# Patient Record
Sex: MALE | Race: WHITE | NOT HISPANIC OR LATINO | Employment: FULL TIME | URBAN - METROPOLITAN AREA
[De-identification: names, ages, dates, MRNs, and addresses within clinical notes are randomized per-mention and may not be internally consistent; named-entity substitution may affect disease eponyms.]

---

## 2017-01-31 ENCOUNTER — CLINICAL SUPPORT (OUTPATIENT)
Dept: GENETICS | Facility: HOSPITAL | Age: 37
End: 2017-01-31

## 2017-01-31 DIAGNOSIS — Z84.89 FAMILY HISTORY OF GENETIC DISEASE: Primary | ICD-10-CM

## 2017-01-31 PROCEDURE — 96040: CPT | Performed by: GENETIC COUNSELOR, MS

## 2017-02-02 NOTE — PROGRESS NOTES
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  Dr. Angelo’s mother has had a history of migraines and recently had a seizure, which led to a brain MRI.  Brain MRI demonstrated widespread white matter disease, which eventually led to genetic testing and confirmation of CADASIL.  DrWilma Angelo does not have a personal history of associated symptoms, and has never had a brain MRI.  Dr. Stephens was interested in discussing the pros and cons of genetic testing for the identified familial mutation and the information that would be gained by genetic testing.    REPORTED FAMILY HISTORY:  Mother:  CADASIL, positive for NOTCH3 mutation     Hx of migraines since teens     Seizures     Word finding difficulty for last 10-15 years  Mat. Grandmother: Hx of ministrokes     Nonverbal and bed ridden for last several years of life       We do have records regarding DrWilma Angelo’s mother’s genetic testing.    GENETIC COUNSELING (40 minutes): CADASIL (cerebral autosomal dominant arteriopathy with subcortical infarcts and leukoencephalopathy) is characterized by mid-adult onset of recurrent ischemic stroke (average age is 47), cognitive decline progressing to dementia (75% experience), a history of migraine with aura (30-40% experience), mood disturbance, apathy, and diffuse white matter lesions and subcortical infarcts on neuroimaging.  The onset of symptoms and severity of symptoms can be quite variable, even within families.  The majority of individuals with CADASIL have identifiable mutations in the NOTCH3 gene, which is the only gene identified at this time to be associated with the condition.  Dr. Stephens’s mother has an identified NOTCH3 mutation.  CADASIL is inherited in an autosomal dominant manner.   Each child of an affected individual is at a 50% risk of inheriting the pathogenic variant and  developing signs of the disease.   This condition is believed to have complete penetrance, meaning everyone who inherits the mutation is expected to show symptoms of the disease at some point.  If DrWilma Stephens tested negative for the specific mutation identified in his mother, this would be considered a “true negative” result, and he would not be at risk for the condition or at risk to pass it on to his children.      GENETIC TESTING:  The risks, benefits and limitations of gene testing and implications for clinical management following testing were reviewed.  DNA test results can influence decisions regarding screening, prevention and surgical management.  Genetic testing can have significant psychological implications for both individuals and families.  Also discussed was the possibility of employment and insurance discrimination based on genetic test results and the laws ([place]) in place to prevent this, as well as the limitations of these laws.    The implications of a positive or negative test result were discussed.  We also discussed that, in some cases, genetic test results may be uninformative or may be ambiguous due to the identification of a genetic variant.  Since a mutation has been identified in an affected individual in this family, single site testing can be offered.  In these cases, there is only the possibility of a positive or a negative result.  We discussed that there are not preventative treatment currently available for CADASIL, so the identification of a NOTCH3 mutation in an unaffected individual does not change immediate management.      PLAN:  We discussed the impact that learning this information would have on his life plans and priorities.  Dr. Stephens is going to meet with Dr. Xiomara Mac, the clinical psychologist at [facility] to discuss this further prior to pursuing testing.  He is scheduled to see her on 2/28/17.  Dr. Stephens is going to contact me if he does decide to pursue  testing.  Genetic counseling remains available to Dr. Stephens and he is welcome to contact us with any questions or concerns.        Josefa Varghese MS, [facility]  Certified Genetic Counselor

## 2017-02-28 ENCOUNTER — OFFICE VISIT (OUTPATIENT)
Dept: PSYCHIATRY | Facility: CLINIC | Age: 37
End: 2017-02-28

## 2017-02-28 DIAGNOSIS — F43.21 ADJUSTMENT DISORDER WITH DEPRESSED MOOD: Primary | ICD-10-CM

## 2017-02-28 PROCEDURE — 90791 PSYCH DIAGNOSTIC EVALUATION: CPT | Performed by: PSYCHOLOGIST

## 2017-04-05 NOTE — PROGRESS NOTES
Psych Progress Note    Jonathan Stephens is 36 y.o. male     1. Description of Session    The pt's chief complaint was stress. A psychological evaluation was conducted in order to assess past and current level of functioning. Areas assessed included, but were not limited to: perception of social support, perception of ability to face and deal with challenges in life (positive functioning), anxiety symptoms, depressive symptoms, perspective on beliefs/belief system, coping skills for stress, intelligence level, etc. Therapeutic rapport was established. Interventions conducted today were geared towards stress management, mood management, and improving communication with others.. Education was also provided as to the nature of counseling with this therapist and what to expect in subsequent sessions. A treatment plan was initiated tailored to meeting pt’s presenting needs. The pt was encouraged to return for additional sessions and agreed to do so.     2. Assessment/Diagnostic Impression    The pt presents as suffering from stress, in part due to difficulties communicating with people but also due to impaired coping skills for stress and engaging in maladaptive thought patterns. Depressive symptoms were evident and seem to be manifested as anger issues, particularly with those closest to him.    3. Treatment Plan    Provide mood management techniques, CBT, and anger management.

## 2020-07-02 ENCOUNTER — TELEMEDICINE (OUTPATIENT)
Dept: CARDIOLOGY | Facility: CLINIC | Age: 40
End: 2020-07-02

## 2020-07-02 VITALS
BODY MASS INDEX: 23.98 KG/M2 | SYSTOLIC BLOOD PRESSURE: 129 MMHG | DIASTOLIC BLOOD PRESSURE: 72 MMHG | HEART RATE: 66 BPM | WEIGHT: 177 LBS | HEIGHT: 72 IN

## 2020-07-02 DIAGNOSIS — Z82.49 FAMILY HISTORY OF PREMATURE CAD: ICD-10-CM

## 2020-07-02 DIAGNOSIS — I10 HYPERTENSION, ESSENTIAL: Primary | ICD-10-CM

## 2020-07-02 PROCEDURE — 99203 OFFICE O/P NEW LOW 30 MIN: CPT | Performed by: INTERNAL MEDICINE

## 2020-07-02 RX ORDER — VALACYCLOVIR HYDROCHLORIDE 1 G/1
TABLET, FILM COATED ORAL AS NEEDED
COMMUNITY

## 2020-07-02 RX ORDER — LISINOPRIL 20 MG/1
TABLET ORAL DAILY
COMMUNITY
End: 2020-07-02

## 2020-07-02 RX ORDER — LISINOPRIL 20 MG/1
20 TABLET ORAL DAILY
Qty: 90 TABLET | Refills: 3 | Status: SHIPPED | OUTPATIENT
Start: 2020-07-02 | End: 2020-07-06 | Stop reason: SDUPTHER

## 2020-07-02 RX ORDER — IBUPROFEN 800 MG/1
TABLET ORAL AS NEEDED
COMMUNITY

## 2020-07-02 NOTE — PROGRESS NOTES
OFFICE VISIT  NOTE  [facility] CARDIOLOGY      Name: Jonathan Stephens    Date: 2020  MRN:  [id number]  :  1980      REFERRING/PRIMARY PROVIDER:  Adelaida Crespo MD    Chief Complaint   Patient presents with   • Hypertension       HPI: Jonathan Stephens is a 39 y.o. male who presents today for new consultation for essential hypertension and family history of premature CAD.  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  Over the years with lifestyle modification he was able to wean down to lisinopril 20 mg daily.  Blood pressure well controlled currently, he takes his blood pressure 2 times per month generally, usually in the 130 or lower systolic range, diastolics 70s to 80s.  He states blood pressures never greater than 140 systolic.  He had a renal arterial duplex in 2016 which was normal.  He has a family history of hypertension both his father and mother, and his older brother were diagnosed with hypertension in their mid 20s.  He denies vision change, headaches, chest pain, shortness of breath, or edema.  Last blood work 1 year ago was normal.  He also has a family history of premature CAD, both his father and his paternal uncle had coronary disease in their mid 50s, he is concerned about his specific risk.  Of note, his mother was diagnosed with CADASIL (cerebral autosomal dominant arteriopathy with subcortical infarcts and leukoencephalopathy).  He discussed this with a genetic counselor, but elected not to pursue genetic testing.  He has no history of migraines, or other neurologic symptoms.  The patient gets at least 150 minutes of aerobic exercise weekly, get 7 to 8 hours of sleep per night, and drinks 3 caffeinated drinks daily.    Past medical history:  Essential hypertension    Past Surgical History:   Procedure Laterality Date   • VASECTOMY         Social  "History     Socioeconomic History   • Marital status:      Spouse name: Not on file   • Number of children: Not on file   • Years of education: Not on file   • Highest education level: Not on file   Tobacco Use   • Smoking status: Former Smoker   • Smokeless tobacco: Never Used   Substance and Sexual Activity   • Alcohol use: Yes   • Drug use: Never   • Sexual activity: Defer       Family History   Problem Relation Age of Onset   • Other Mother         CADASIL   • Coronary artery disease Father 50   • Hypertension Brother 20        ROS:   Constitutional no fever,  no weight loss   Skin no rash, no subcutaneous nodules   Otolaryngeal no difficulty swallowing   Cardiovascular See HPI   Pulmonary no cough, no sputum production   Gastrointestinal no constipation, no diarrhea   Genitourinary no dysuria, no hematuria   Hematologic no easy bruisability, no abnormal bleeding   Musculoskeletal no muscle pain   Neurologic no dizziness, no falls         No Known Allergies      Current Outpatient Medications:   •  ibuprofen (ADVIL,MOTRIN) 800 MG tablet, Every 8 (Eight) Hours., Disp: , Rfl:   •  lisinopril (PRINIVIL,ZESTRIL) 20 MG tablet, 1 tablet Daily., Disp: 90 tablet, Rfl: 3  •  valACYclovir (Valtrex) 1000 MG tablet, As Needed., Disp: , Rfl:     Vitals:    07/02/20 0908   BP: 129/72   BP Location: Left arm   Patient Position: Sitting   Pulse: 66   Weight: 80.3 kg (177 lb)   Height: 182.9 cm (72\")     Body mass index is 24.01 kg/m².    PHYSICAL EXAM:    VITALS: Pt reports temp of 98.4 degrees F orally. Home BP today 129/72, and Heart rate of 66  Weight measured at home: 177 Lbs.  GENERAL: Stable appearing, in no distress. Alert and oriented.  SKIN: no visible facial rash or concerning facial lesions noted. No skin redness or discoloration seen. Patient denies new skin findings on brief self-exam of arms, legs, chest, abdomen.  EYES: conjunctiva clear, sclera non-icteric, no eye drainage, grossly normal EOM.  EARS: " hearing grossly intact, no pain elicited with ear tugging bilaterally  MOUTH: no visible perioral lesions, no perioral cyanosis, no lip swelling.  NECK: Grossly normal ROM, no visible thyroid enlargement. Patient did not palpate any cervical LAD under physician-guided exam.  HEART: Patient self-reported heart rate of 66 beats per minute (measured by pt with physician instruction, or by fitness tracker HR monitor).  LUNGS: Does not appear dyspneic. No audible wheezes or rales. No nasal flaring.  ABDOMEN: no tenderness with patient self-palpation diffusely around abdomen under physician guidance, no pain elicited by leg lifts  MUSCULOSKELETAL: No significant cervical kyphosis, grossly normal active ROM in upper extremities.  NEURO: Intact recent memory. No facial or eyelid drooping. No speech impairment, answers questions appropriately.  PSYCH: Judgment and insight good; normal mood and affect.        RESULTS:   Procedures           Labs:  No results found for: CHOL, TRIG, HDL, LDL, AST, ALT  No results found for: HGBA1C  No components found for: CREATINININE  No results found for: EGFRIFNONA      ASSESSMENT:  Problem List Items Addressed This Visit        Cardiovascular and Mediastinum    Hypertension, essential - Primary    Relevant Medications    lisinopril (PRINIVIL,ZESTRIL) 20 MG tablet       Other    Family history of premature CAD          PLAN:    1.  Essential hypertension:  Negative secondary work-up in 2016  Long-term goal blood pressure less than 130/80, ideally closer to 120/80  At this time he has good blood pressure control on lisinopril 20 mg daily  Continue lisinopril 20 mg daily, refill sent to [facility].  Check BMP yearly  We discussed the importance of continued healthy lifestyle habits such as exercise, sleep, and diet    2.  Family history of premature CAD:  While the patient is currently asymptomatic, but he has risk factors for coronary disease  I recommended a coronary CTA with calcium  score to further rule stratify  If coronary plaque is noted, statin therapy will be started.  He will think about the test and call us to schedule.    I also recommend a fasting lipid panel at his next PCP visit    3.  Family history of CADASIL:  Patient's mother has the condition, he discussed this with a genetic counselor, but elected not to be tested.  We will continue aggressive risk factor modification with blood pressure control and healthy lifestyle.    Return to clinic in 12 months for a video visit, or sooner as needed.    This patient has consented to a telehealth visit via video visit. The visit was scheduled as a video conference visit to comply with patient safety concerns in accordance with CDC recommendations.  All vitals recorded within this visit are reported by the patient.  I spent 30 minutes in total including but not limited to the 22 minutes spent in direct conversation with this patient.       Pb Billingsley MD, [facility]  Office: [phone] [address] [address]

## 2020-07-06 RX ORDER — LISINOPRIL 20 MG/1
20 TABLET ORAL DAILY
Qty: 90 TABLET | Refills: 3 | Status: SHIPPED | OUTPATIENT
Start: 2020-07-06

## 2021-06-15 ENCOUNTER — TELEPHONE (OUTPATIENT)
Dept: ORTHOPEDIC SURGERY | Facility: CLINIC | Age: 41
End: 2021-06-15

## 2021-06-15 NOTE — TELEPHONE ENCOUNTER
I called the patient per Dr. Borden to have him added onto the schedule per Dr. Borden in about 2 weeks. When he calls back, have him transferred to the clinic.    Veronica MISHRA

## 2021-06-29 ENCOUNTER — OFFICE VISIT (OUTPATIENT)
Dept: ORTHOPEDIC SURGERY | Facility: CLINIC | Age: 41
End: 2021-06-29

## 2021-06-29 VITALS
HEART RATE: 73 BPM | SYSTOLIC BLOOD PRESSURE: 140 MMHG | BODY MASS INDEX: 23.5 KG/M2 | DIASTOLIC BLOOD PRESSURE: 74 MMHG | HEIGHT: 72 IN | WEIGHT: 173.5 LBS

## 2021-06-29 DIAGNOSIS — M25.511 RIGHT SHOULDER PAIN, UNSPECIFIED CHRONICITY: ICD-10-CM

## 2021-06-29 DIAGNOSIS — G25.89 SCAPULAR DYSKINESIS: ICD-10-CM

## 2021-06-29 DIAGNOSIS — M19.011 OSTEOARTHRITIS OF RIGHT AC (ACROMIOCLAVICULAR) JOINT: Primary | ICD-10-CM

## 2021-06-29 PROCEDURE — 99203 OFFICE O/P NEW LOW 30 MIN: CPT | Performed by: ORTHOPAEDIC SURGERY

## 2021-06-29 PROCEDURE — 20605 DRAIN/INJ JOINT/BURSA W/O US: CPT | Performed by: ORTHOPAEDIC SURGERY

## 2021-06-29 RX ORDER — LIDOCAINE HYDROCHLORIDE 10 MG/ML
3 INJECTION, SOLUTION INFILTRATION; PERINEURAL
Status: COMPLETED | OUTPATIENT
Start: 2021-06-29 | End: 2021-06-29

## 2021-06-29 RX ORDER — METHYLPREDNISOLONE ACETATE 80 MG/ML
80 INJECTION, SUSPENSION INTRA-ARTICULAR; INTRALESIONAL; INTRAMUSCULAR; SOFT TISSUE
Status: COMPLETED | OUTPATIENT
Start: 2021-06-29 | End: 2021-06-29

## 2021-06-29 RX ORDER — PIMECROLIMUS 10 MG/G
CREAM TOPICAL
COMMUNITY
Start: 2021-05-03

## 2021-06-29 RX ADMIN — LIDOCAINE HYDROCHLORIDE 3 ML: 10 INJECTION, SOLUTION INFILTRATION; PERINEURAL at 13:58

## 2021-06-29 RX ADMIN — METHYLPREDNISOLONE ACETATE 80 MG: 80 INJECTION, SUSPENSION INTRA-ARTICULAR; INTRALESIONAL; INTRAMUSCULAR; SOFT TISSUE at 13:58

## 2021-06-29 NOTE — PROGRESS NOTES
[facility] Orthopaedic Surgery Office Visit - Isaac Borden MD    Office Visit       Patient Name: Jonathan Stephens    Chief Complaint:   Chief Complaint   Patient presents with   • Right Shoulder - Pain       Referring Physician: No ref. provider found    History of Present Illness:   Jonathan Stephens is a 40 y.o. male who presents with right body part: shoulder Reason: pain.  Onset:Onset: atraumatic and gradual in nature. The issue has been ongoing for 4 year(s). Pain is a 3/10 on the pain scale. Pain is described as Pain Characterization: dull and aching. Associated symptoms include Symptoms: pain and popping. The pain is worse with sleeping, leisure and lying on affected side; resting improve the pain. Previous treatments have included: NSAIDS.  I have reviewed the patient's history of present illness as noted/entered above.    I have reviewed the patient's past medical history, surgical history, social history, family history, medications, and allergies as noted in the electronic medical record and as noted/entered.  I have reviewed the patient's review of systems as noted/enter and updated as noted in the patient's HPI.    Right AC joint osteolysis  Longtime friend that I treated in the past for right AC joint osteolysis compared to prior outside hospital/[facility] images -12/23/2015 [facility]    Right AC joint pain persist affecting sleep and ADLs    Ophthalmologist      Subjective   Subjective      Review of Systems   Constitutional: Negative.  Negative for chills, fatigue and fever.   HENT: Negative.  Negative for congestion and dental problem.    Eyes: Negative.  Negative for blurred vision.   Respiratory: Negative.  Negative for shortness of breath.    Cardiovascular: Negative.  Negative for leg swelling.   Gastrointestinal: Negative.  Negative for abdominal pain.   Endocrine: Negative.  Negative for polyuria.   Genitourinary:  Negative.  Negative for difficulty urinating.   Musculoskeletal: Positive for arthralgias.   Skin: Negative.    Allergic/Immunologic: Negative.    Neurological: Negative.    Hematological: Negative.  Negative for adenopathy.   Psychiatric/Behavioral: Negative.  Negative for behavioral problems.        Past Medical History:   Past Medical History:   Diagnosis Date   • HTN, goal below 130/80    • Osteoarthritis        Past Surgical History:   Past Surgical History:   Procedure Laterality Date   • KNEE SURGERY      osteochondroma   • VASECTOMY         Family History:   Family History   Problem Relation Age of Onset   • Other Mother         CADASIL   • Cancer Mother    • Stroke Mother    • Hypertension Mother    • Coronary artery disease Father 50   • Osteoarthritis Father    • Hypertension Father    • Heart attack Father    • Hypertension Brother 20       Social History:   Social History     Socioeconomic History   • Marital status:      Spouse name: Not on file   • Number of children: Not on file   • Years of education: Not on file   • Highest education level: Not on file   Tobacco Use   • Smoking status: Former Smoker   • Smokeless tobacco: Never Used   Substance and Sexual Activity   • Alcohol use: Yes   • Drug use: Never   • Sexual activity: Defer       Medications:   Current Outpatient Medications:   •  lisinopril (PRINIVIL,ZESTRIL) 20 MG tablet, Take 1 tablet by mouth Daily., Disp: 90 tablet, Rfl: 3  •  ibuprofen (ADVIL,MOTRIN) 800 MG tablet, Every 8 (Eight) Hours., Disp: , Rfl:   •  pimecrolimus (ELIDEL) 1 % cream, , Disp: , Rfl:   •  valACYclovir (Valtrex) 1000 MG tablet, As Needed., Disp: , Rfl:     Allergies: No Known Allergies    The following portions of the patient's history were reviewed and updated as appropriate: allergies, current medications, past family history, past medical history, past social history, past surgical history and problem list.        Objective    Objective      Vital Signs:    "  Vitals:    06/29/21 1318   BP: 140/74   Pulse: 73   Weight: 78.7 kg (173 lb 8 oz)   Height: 182.9 cm (72.01\")       Ortho Exam:  RIGHT AC joint pain with crossbody adduction  Stable AC joint  5/5 RC strength  Well perfused  SLT intact  No skin changes    Results Review:   Imaging Results (Last 24 Hours)     Procedure Component Value Units Date/Time    XR Shoulder 1 View Bilateral [[id number]] Resulted: 06/29/21 1355     Updated: 06/29/21 1356    Narrative:      Imaging: AC joint x-rays - bilateral for comparison    Bilateral AC joint x-rays    Affected/Injured Side: RIGHT    Indication for AC joint x-ray: AC joint pain    Comparison: the unaffected/non-injured side had an x-ray of the AC joint   performed today for comparison to the affected/injured side    Findings: Right AC joint shows distal clavicle osteolysis and joint space   narrowing consistent with an early arthritic process.    The unaffected/comparison side shows a well aligned AC joint.    I personally reviewed the above x-rays and discussed with the patient.              Procedures     RIGHT SHOULDER ACROMIOCLAVICULAR JOINT INJECTION:  Risks and benefits of a shoulder acromioclavicular joint injection were discussed and the patient desired to proceed. Verbal consent was obtained. The patient understood the risk of infection, potential skin changes, bump in blood glucose especially with diabetes, nerve injury, possibility of increased pain in the short term, and possible incomplete pain relief. Using sterile technique, the shoulder acromioclavicular joint was injected from a superior approach with 1mL of 80mg/mL Depo Medrol and 4cc of lidocaine with aspiration prior to injection. The patient tolerated the procedure without difficulty.  CPT CODE 74818 for intermediate joint (AC joint) aspiration/injection          Assessment / Plan      Assessment/Plan:   Problem List Items Addressed This Visit        Musculoskeletal and Injuries    Osteoarthritis of " right AC (acromioclavicular) joint - Primary    Relevant Orders    Ambulatory Referral to Physical Therapy Evaluate and treat, Ortho (Completed)       Neuro    Scapular dyskinesis    Relevant Orders    Ambulatory Referral to Physical Therapy Evaluate and treat, Ortho (Completed)      Other Visit Diagnoses     Right shoulder pain, unspecified chronicity        Relevant Orders    XR Shoulder 1 View Bilateral (Completed)          RIGHT AC JOINT pain  Corticosteroid injection completed, recommend conservative course, physical therapy with James Morris at [facility]  I think he will continue to well a conservative course, scapular stabilization exercises will help    Follow Up: He is a close friend and will call me with any issues along the way and for follow-up.    Great to see Dr. Angelo Borden MD, FAAOS  Orthopedic Surgeon  Fellowship Trained Shoulder and Elbow Surgeon  [facility]  Orthopedics and Sports Medicine  [address] [place] [address], [address]  [place] [address]    06/29/21  14:14 EDT    Please note that portions of this note may have been completed with a voice recognition program. Efforts were made to edit the dictations, but occasionally words are mistranscribed.

## 2021-06-29 NOTE — PROGRESS NOTES
Procedure   Medium Joint Arthrocentesis: R acromioclavicular  Date/Time: 6/29/2021 1:58 PM  Consent given by: patient  Site marked: site marked  Timeout: Immediately prior to procedure a time out was called to verify the correct patient, procedure, equipment, support staff and site/side marked as required   Supporting Documentation  Indications: pain   Procedure Details  Location: shoulder - R acromioclavicular  Preparation: Patient was prepped and draped in the usual sterile fashion  Needle size: 23 G  Approach: superior  Medications administered: 80 mg methylPREDNISolone acetate 80 MG/ML; 3 mL lidocaine 1 %  Patient tolerance: patient tolerated the procedure well with no immediate complications

## 2022-01-11 ENCOUNTER — OFFICE VISIT (OUTPATIENT)
Dept: ORTHOPEDIC SURGERY | Facility: CLINIC | Age: 42
End: 2022-01-11

## 2022-01-11 VITALS
BODY MASS INDEX: 24.19 KG/M2 | WEIGHT: 178.6 LBS | SYSTOLIC BLOOD PRESSURE: 112 MMHG | HEIGHT: 72 IN | DIASTOLIC BLOOD PRESSURE: 70 MMHG

## 2022-01-11 DIAGNOSIS — M19.011 OSTEOARTHRITIS OF RIGHT AC (ACROMIOCLAVICULAR) JOINT: Primary | ICD-10-CM

## 2022-01-11 PROCEDURE — 20605 DRAIN/INJ JOINT/BURSA W/O US: CPT | Performed by: ORTHOPAEDIC SURGERY

## 2022-01-11 PROCEDURE — 99213 OFFICE O/P EST LOW 20 MIN: CPT | Performed by: ORTHOPAEDIC SURGERY

## 2022-01-11 RX ORDER — KETOCONAZOLE 20 MG/ML
SHAMPOO TOPICAL AS NEEDED
COMMUNITY

## 2022-01-11 RX ORDER — TRIAMCINOLONE ACETONIDE 40 MG/ML
40 INJECTION, SUSPENSION INTRA-ARTICULAR; INTRAMUSCULAR
Status: COMPLETED | OUTPATIENT
Start: 2022-01-11 | End: 2022-01-11

## 2022-01-11 RX ORDER — LIDOCAINE HYDROCHLORIDE 10 MG/ML
3 INJECTION, SOLUTION EPIDURAL; INFILTRATION; INTRACAUDAL; PERINEURAL
Status: COMPLETED | OUTPATIENT
Start: 2022-01-11 | End: 2022-01-11

## 2022-01-11 RX ADMIN — TRIAMCINOLONE ACETONIDE 40 MG: 40 INJECTION, SUSPENSION INTRA-ARTICULAR; INTRAMUSCULAR at 13:20

## 2022-01-11 RX ADMIN — LIDOCAINE HYDROCHLORIDE 3 ML: 10 INJECTION, SOLUTION EPIDURAL; INFILTRATION; INTRACAUDAL; PERINEURAL at 13:20

## 2022-01-11 NOTE — PROGRESS NOTES
[facility] Orthopaedic Surgery Office Follow Up       Office Follow Up Visit       Patient Name: Jonathan Stephens    Chief Complaint:   Chief Complaint   Patient presents with   • Follow-up     6 month recheck - Osteoarthritis of Right AC joint       Referring Physician: No ref. provider found    History of Present Illness:   It has been 6  month(s) since Jonathan Stephens's last visit. Jonathan Stephens returns to clinic today for F/U: follow-up of rightBody Part: shoulderReason: arthritis. The issue has been ongoing for 2 year(s). Jonathan Stephens rates HIS/HER: hispain at 3/10 on the pain scale. Previous/current treatments: NSAIDS and physical therapy. Current symptoms:Symptoms: pain, popping and grinding. The pain is worse with working, leisure and lying on affected side; resting improves the pain. Overall, he/she: heis doing the same.  I have reviewed the patient's history of present illness as noted/entered above.    I have reviewed the patient's past medical history, surgical history, social history, family history, medications, and allergies as noted in the electronic medical record and as noted/entered.  I have reviewed the patient's review of systems as noted/enter and updated as noted in the patient's HPI.    Right AC joint osteolysis    1/11/2022:  AC joint pain has been exacerbated with more popping and pain with push-ups and distance running.  Significant pain over the weekend this past Sunday.  Prior injection did help significantly with complete relief      Prior:  Longtime friend that I treated in the past for right AC joint osteolysis compared to prior outside hospital/[facility] images -12/23/2015 [facility]     Right AC joint pain persist affecting sleep and ADLs     Ophthalmologist      Subjective   Subjective      Review of Systems   Constitutional: Negative.  Negative for chills, fatigue and fever.   HENT: Negative.  Negative for  congestion and dental problem.    Eyes: Negative.  Negative for blurred vision.   Respiratory: Negative.  Negative for shortness of breath.    Cardiovascular: Negative.  Negative for leg swelling.   Gastrointestinal: Negative.  Negative for abdominal pain.   Endocrine: Negative.  Negative for polyuria.   Genitourinary: Negative.  Negative for difficulty urinating.   Musculoskeletal: Positive for arthralgias.   Skin: Negative.    Allergic/Immunologic: Negative.    Neurological: Negative.    Hematological: Negative.  Negative for adenopathy.   Psychiatric/Behavioral: Negative.  Negative for behavioral problems.        Past Medical History:   Past Medical History:   Diagnosis Date   • HTN, goal below 130/80    • Osteoarthritis        Past Surgical History:   Past Surgical History:   Procedure Laterality Date   • KNEE SURGERY      osteochondroma   • VASECTOMY         Family History:   Family History   Problem Relation Age of Onset   • Other Mother         CADASIL   • Cancer Mother    • Stroke Mother    • Hypertension Mother    • Coronary artery disease Father 50   • Osteoarthritis Father    • Hypertension Father    • Heart attack Father    • Hypertension Brother 20       Social History:   Social History     Socioeconomic History   • Marital status:    Tobacco Use   • Smoking status: Former Smoker   • Smokeless tobacco: Never Used   Substance and Sexual Activity   • Alcohol use: Yes   • Drug use: Never   • Sexual activity: Defer       Medications:   Current Outpatient Medications:   •  ibuprofen (ADVIL,MOTRIN) 800 MG tablet, Every 8 (Eight) Hours., Disp: , Rfl:   •  ketoconazole (NIZORAL) 2 % shampoo, As Needed., Disp: , Rfl:   •  lisinopril (PRINIVIL,ZESTRIL) 20 MG tablet, Take 1 tablet by mouth Daily., Disp: 90 tablet, Rfl: 3  •  pimecrolimus (ELIDEL) 1 % cream, , Disp: , Rfl:   •  valACYclovir (Valtrex) 1000 MG tablet, As Needed., Disp: , Rfl:     Allergies: No Known Allergies    The following portions of the  "patient's history were reviewed and updated as appropriate: allergies, current medications, past family history, past medical history, past social history, past surgical history and problem list.        Objective    Objective      Vital Signs:   Vitals:    01/11/22 1249   BP: 112/70   Weight: 81 kg (178 lb 9.6 oz)   Height: 182.9 cm (72.01\")       Ortho Exam:  Pain with cross body adduction right shoulder  Some popping with AC joint testing.  Some mild baseline prominence of the distal clavicle pain about the distal clavicle  Negative DLS, negative rotator cuff testing active and passive range of motion intact pain with forward flexion abduction greater than 120    Results Review:  Imaging Results (Last 24 Hours)     Procedure Component Value Units Date/Time    XR Acromioclavicular Joint Right [[id number]] Resulted: 01/11/22 1314     Updated: 01/11/22 1316    Narrative:      Imaging: AC joint x-rays    Side: RIGHT AC JOINT    Indication for AC joint x-ray: AC joint pain    Comparison: Prior serial comparison views available    Findings: RIGHT AC joint baseline degenerative changes with some worsening   of the AC joint space narrowing and distal clavicle osteolysis.    I personally reviewed the above x-rays and discussed with the patient.            Procedures    RIGHT SHOULDER ACROMIOCLAVICULAR JOINT INJECTION:  Risks and benefits of a shoulder acromioclavicular joint injection were discussed and the patient desired to proceed. Verbal consent was obtained. The patient understood the risk of infection, potential skin changes, bump in blood glucose especially with diabetes, nerve injury, possibility of increased pain in the short term, and possible incomplete pain relief. Using sterile technique, the shoulder acromioclavicular joint was injected from a superior approach with 1mL of 40 mg triamcinolone acetonide 40 MG/ML and 4cc of lidocaine with aspiration prior to injection. The patient tolerated the procedure without " difficulty.  CPT CODE 70363 for intermediate joint (AC joint) aspiration/injection        Assessment / Plan      Assessment/Plan:   Problem List Items Addressed This Visit        Musculoskeletal and Injuries    Osteoarthritis of right AC (acromioclavicular) joint - Primary    Relevant Orders    XR Acromioclavicular Joint Right (Completed)          RIGHT SHOULDER --distal clavicle osteolysis, AC joint arthritic changes still remain fairly significant.  He is going to consider operative intervention likely would be a mini open distal clavicle resection.  We will get a CT scan in advance of this.  He has no glenohumeral joint or rotator cuff symptoms.  He is contemplating timing for this.  We will try another injection today.  We discussed continued nonoperative measures versus operative measures including recovery time operative intervention techniques.    Follow Up: He is a close friend keep me updated on possible timing of the CT scan and possible timing of surgery pending progress.      Isaac Borden MD, FAAOS  Orthopedic Surgeon  Fellowship Trained Shoulder and Elbow Surgeon  [facility]  Orthopedics and Sports Medicine  [address], [address]  [place] [address]    01/11/22  13:44 EST

## 2022-01-11 NOTE — PROGRESS NOTES
Procedure   Medium Joint Arthrocentesis: R acromioclavicular  Date/Time: 1/11/2022 1:20 PM  Consent given by: patient  Site marked: site marked  Timeout: Immediately prior to procedure a time out was called to verify the correct patient, procedure, equipment, support staff and site/side marked as required   Supporting Documentation  Indications: pain   Procedure Details  Location: shoulder - R acromioclavicular  Preparation: Patient was prepped and draped in the usual sterile fashion  Needle size: 22 G  Approach: anterolateral  Medications administered: 40 mg triamcinolone acetonide 40 MG/ML; 3 mL lidocaine PF 1% 1 %  Patient tolerance: patient tolerated the procedure well with no immediate complications

## 2022-02-15 ENCOUNTER — DOCUMENTATION (OUTPATIENT)
Dept: ORTHOPEDIC SURGERY | Facility: CLINIC | Age: 42
End: 2022-02-15

## 2022-02-15 DIAGNOSIS — M19.011 OSTEOARTHRITIS OF RIGHT AC (ACROMIOCLAVICULAR) JOINT: Primary | ICD-10-CM

## 2022-02-15 DIAGNOSIS — M25.511 RIGHT SHOULDER PAIN, UNSPECIFIED CHRONICITY: ICD-10-CM

## 2022-02-15 DIAGNOSIS — G25.89 SCAPULAR DYSKINESIS: ICD-10-CM

## 2022-02-15 DIAGNOSIS — M75.41 IMPINGEMENT SYNDROME OF RIGHT SHOULDER: ICD-10-CM

## 2022-02-15 NOTE — PROGRESS NOTES
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  He is continue to have anterolateral pain pain of the deltoid concerning for possible rotator cuff pathology.  He does have trouble overhead to with throwing and possible labral involvement.    He has known symptomatic AC joint arthritic changes with distal clavicle osteolysis.    We will obtain an MRI arthrogram of the right shoulder for further evaluation given persistent pain will out rotator cuff tear, labral tear and assess the distal clavicle osteolysis.    RIGHT SHOULDER  MRI of the shoulder WITH arthrogram is recommended.  The arthrogram is a critical component to better assess the glenoid labrum for possible tearing.  Indication: suspected labral tear  The MRI is critical to evaluate for labral tearing and will help with possible surgical planning.

## 2022-02-16 ENCOUNTER — TELEPHONE (OUTPATIENT)
Dept: ORTHOPEDIC SURGERY | Facility: CLINIC | Age: 42
End: 2022-02-16

## 2022-02-16 NOTE — TELEPHONE ENCOUNTER
Provider: DR. VARGAS     Caller: RIVERA GUTIERRES    Relationship to Patient: SELF      Phone Number: [phone]    Reason for Call:  PATIENT ADVISED HE HAS NOT RECEIVED A CALL TO SCHEDULE MRI FOR HIS RIGHT SHOULDER.

## 2022-03-15 ENCOUNTER — HOSPITAL ENCOUNTER (OUTPATIENT)
Dept: GENERAL RADIOLOGY | Facility: HOSPITAL | Age: 42
Discharge: HOME | End: 2022-03-15

## 2022-03-15 ENCOUNTER — HOSPITAL ENCOUNTER (OUTPATIENT)
Dept: MRI IMAGING | Facility: HOSPITAL | Age: 42
Discharge: HOME | End: 2022-03-15

## 2022-03-15 DIAGNOSIS — M75.41 IMPINGEMENT SYNDROME OF RIGHT SHOULDER: ICD-10-CM

## 2022-03-15 DIAGNOSIS — M19.011 OSTEOARTHRITIS OF RIGHT AC (ACROMIOCLAVICULAR) JOINT: ICD-10-CM

## 2022-03-15 DIAGNOSIS — M25.511 RIGHT SHOULDER PAIN, UNSPECIFIED CHRONICITY: ICD-10-CM

## 2022-03-15 DIAGNOSIS — G25.89 SCAPULAR DYSKINESIS: ICD-10-CM

## 2022-03-15 PROCEDURE — A9577 INJ MULTIHANCE: HCPCS | Performed by: ORTHOPAEDIC SURGERY

## 2022-03-15 PROCEDURE — 25010000002 IOPAMIDOL 61 % SOLUTION: Performed by: ORTHOPAEDIC SURGERY

## 2022-03-15 PROCEDURE — 0 LIDOCAINE 1 % SOLUTION: Performed by: STUDENT IN AN ORGANIZED HEALTH CARE EDUCATION/TRAINING PROGRAM

## 2022-03-15 PROCEDURE — 77002 NEEDLE LOCALIZATION BY XRAY: CPT

## 2022-03-15 PROCEDURE — 0 GADOBENATE DIMEGLUMINE 529 MG/ML SOLUTION: Performed by: ORTHOPAEDIC SURGERY

## 2022-03-15 PROCEDURE — 73222 MRI JOINT UPR EXTREM W/DYE: CPT

## 2022-03-15 RX ORDER — LIDOCAINE HYDROCHLORIDE 10 MG/ML
10 INJECTION, SOLUTION INFILTRATION; PERINEURAL ONCE
Status: COMPLETED | OUTPATIENT
Start: 2022-03-15 | End: 2022-03-15

## 2022-03-15 RX ADMIN — GADOBENATE DIMEGLUMINE: 529 INJECTION, SOLUTION INTRAVENOUS at 14:59

## 2022-03-15 RX ADMIN — LIDOCAINE HYDROCHLORIDE 10 ML: 10 INJECTION, SOLUTION INFILTRATION; PERINEURAL at 15:00

## 2022-03-15 RX ADMIN — IOPAMIDOL 10 ML: 612 INJECTION, SOLUTION INTRAVENOUS at 15:00

## 2022-08-24 ENCOUNTER — TELEPHONE (OUTPATIENT)
Dept: ORTHOPEDIC SURGERY | Facility: CLINIC | Age: 42
End: 2022-08-24

## 2022-08-24 NOTE — TELEPHONE ENCOUNTER
Caller: PATIENT     Relationship to patient: SELF     Best call back number: [phone]    Chief complaint: RIGHT SHOULDER     Type of visit: SURGERY AND PRE OP TESTING     Requested date: WEEK OF THANKSGIVING    If rescheduling, when is the original appointment: N/A     Additional notes: PATIENT STATES HE IS A FRIEND OF DR VARGAS AND SPOKE TO THIS WEEKEND. HE STATES DR VARGAS TOLD HIM HE WOULD BE DOING SURGERY THE Monday OF THANKSGIVING WEEK AND HE IS INTERESED IN THAT. PLEASE CALL TO DISCUSS.

## 2022-08-24 NOTE — TELEPHONE ENCOUNTER
Called patient and advised him that I would give the information to the surgery scheduler his information and they would get back with him ASAP,

## 2022-09-13 ENCOUNTER — OFFICE VISIT (OUTPATIENT)
Dept: ORTHOPEDIC SURGERY | Facility: CLINIC | Age: 42
End: 2022-09-13

## 2022-09-13 VITALS
WEIGHT: 176.8 LBS | BODY MASS INDEX: 23.95 KG/M2 | SYSTOLIC BLOOD PRESSURE: 134 MMHG | HEIGHT: 72 IN | DIASTOLIC BLOOD PRESSURE: 82 MMHG

## 2022-09-13 DIAGNOSIS — G25.89 SCAPULAR DYSKINESIS: ICD-10-CM

## 2022-09-13 DIAGNOSIS — S43.431A SUPERIOR GLENOID LABRUM LESION OF RIGHT SHOULDER, INITIAL ENCOUNTER: ICD-10-CM

## 2022-09-13 DIAGNOSIS — M25.511 RIGHT SHOULDER PAIN, UNSPECIFIED CHRONICITY: ICD-10-CM

## 2022-09-13 DIAGNOSIS — M19.011 OSTEOARTHRITIS OF RIGHT AC (ACROMIOCLAVICULAR) JOINT: Primary | ICD-10-CM

## 2022-09-13 DIAGNOSIS — M75.41 IMPINGEMENT SYNDROME OF RIGHT SHOULDER: ICD-10-CM

## 2022-09-13 PROCEDURE — 99214 OFFICE O/P EST MOD 30 MIN: CPT | Performed by: ORTHOPAEDIC SURGERY

## 2022-09-13 NOTE — PROGRESS NOTES
[facility] [place] Up       Office Follow Up Visit       Patient Name: Jonathan Stephens    Chief Complaint:   Chief Complaint   Patient presents with   • Follow-up     8 month follow up; Osteoarthritis of right AC (acromioclavicular) joint        Referring Physician: No ref. provider found    History of Present Illness:   It has been 8  month(s) since Jonathan Stephens's last visit. Jonathan Stephens returns to clinic today for F/U: follow-up of rightBody Part: shoulderReason: pain. The issue has been ongoing for 3+ year(s). Jonathan Stephens rates HIS/HER: hispain at 1-2/10 on the pain scale. Previous/current treatments: NSAIDS. Current symptoms:Symptoms: pain, popping and same as prior visit. The pain is worse with leisure and lying on affected side; resting improves the pain. Overall, he/she: heis doing the same.  I have reviewed the patient's history of present illness as noted/entered above.    I have reviewed the patient's past medical history, surgical history, social history, family history, medications, and allergies as noted in the electronic medical record and as noted/entered.  I have reviewed the patient's review of systems as noted/enter and updated as noted in the patient's HPI.    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  The AC joint appears to be his primary pain generator he is able to throw ball with his son appears that the SLAP tear is less problematic/less symptomatic than the AC joint.  We will certainly have an operative plan to include the SLAP but primarily anticipate doing an arthroscopic distal clavicle excision given significant distal clavicle osteolysis    He does desire to proceed with surgery at this time targeting 11/21/2022      Subjective   Subjective      Review of Systems   Constitutional: Negative.  Negative  for chills, fatigue and fever.   HENT: Negative.  Negative for congestion and dental problem.    Eyes: Negative.  Negative for blurred vision.   Respiratory: Negative.  Negative for shortness of breath.    Cardiovascular: Negative.  Negative for leg swelling.   Gastrointestinal: Negative.  Negative for abdominal pain.   Endocrine: Negative.  Negative for polyuria.   Genitourinary: Negative.  Negative for difficulty urinating.   Musculoskeletal: Positive for arthralgias.   Skin: Negative.    Allergic/Immunologic: Negative.    Neurological: Negative.    Hematological: Negative.  Negative for adenopathy.   Psychiatric/Behavioral: Negative.  Negative for behavioral problems.        Past Medical History:   Past Medical History:   Diagnosis Date   • HTN, goal below 130/80    • Osteoarthritis    • Periarthritis of shoulder        Past Surgical History:   Past Surgical History:   Procedure Laterality Date   • KNEE SURGERY      osteochondroma   • VASECTOMY         Family History:   Family History   Problem Relation Age of Onset   • Other Mother         CADASIL   • Cancer Mother         Osteosarcoma   • Stroke Mother    • Hypertension Mother    • Coronary artery disease Father 50   • Osteoarthritis Father    • Hypertension Father    • Heart attack Father    • Hypertension Brother 20       Social History:   Social History     Socioeconomic History   • Marital status:    Tobacco Use   • Smoking status: Former Smoker   • Smokeless tobacco: Never Used   Vaping Use   • Vaping Use: Never used   Substance and Sexual Activity   • Alcohol use: Yes   • Drug use: Never   • Sexual activity: Defer       Medications:   Current Outpatient Medications:   •  lisinopril (PRINIVIL,ZESTRIL) 20 MG tablet, Take 1 tablet by mouth Daily., Disp: 90 tablet, Rfl: 3  •  pimecrolimus (ELIDEL) 1 % cream, , Disp: , Rfl:   •  valACYclovir (VALTREX) 1000 MG tablet, As Needed., Disp: , Rfl:   •  ibuprofen (ADVIL,MOTRIN) 800 MG tablet, As Needed., Disp:  ", Rfl:   •  ketoconazole (NIZORAL) 2 % shampoo, As Needed., Disp: , Rfl:     Allergies: No Known Allergies    The following portions of the patient's history were reviewed and updated as appropriate: allergies, current medications, past family history, past medical history, past social history, past surgical history and problem list.        Objective    Objective      Vital Signs:   Vitals:    09/13/22 1310   BP: 134/82   Weight: 80.2 kg (176 lb 12.8 oz)   Height: 182.9 cm (72.01\")       Ortho Exam:  Negative DLS today.  Good rotator cuff strength.  Primary pain generator appears to be the distal clavicle with point tenderness of the distal clavicle positive pain with adduction positive Cross's at the distal clavicle.  Negative biceps testing.  Painful arc of motion with Neer and Vergara.  Painful arc of motion more than 120 degrees    Results Review:  Imaging Results (Last 24 Hours)     ** No results found for the last 24 hours. **        His MRI in the system has been reviewed multiple times.  He has clear evidence of distal clavicle osteolysis and AC joint arthritic changes in the [facility] MRI arthrogram.  He also has a SLAP 2 tear which may be an incidental finding given his current clinical course    Procedures            Assessment / Plan      Assessment/Plan:   Problem List Items Addressed This Visit        Musculoskeletal and Injuries    Osteoarthritis of right AC (acromioclavicular) joint - Primary    Superior glenoid labrum lesion of right shoulder    Right shoulder pain    Impingement syndrome of right shoulder       Neuro    Scapular dyskinesis        Right shoulder distal clavicle osteolysis, AC joint arthritic changes, incidentally noted SLAP tear.  Patient does desire to proceed with surgery.    Risks and benefits of continued nonoperative management versus surgical management were discussed. The patient desires to proceed with operative intervention.    Rotator cuff repair with possible " "biceps tenodesis and subacromial decompression with acromioplasty as indicated. Sometimes the rotator cuff tear is not repairable and may require debridement or partial repair. The procedure is routinely done arthroscopically, but sometimes a larger incision needs to be made to complete the repair in an \"open\" fashion for rare cases.    Specific risks include pain, bleeding, infection, injury to surrounding nerve and blood vessels, fracture, failure or lack of healing of rotator cuff repair, incomplete pain relief, hardware failure, potential need for additional procedures, stiffness after surgery, possible biceps deformity, and potential inability to restore range of motion and strength. Medical, anesthetic, and block complications are possible.    General anesthesia is required, sling compliance, and compliance with physical therapy and/or a surgeon guided exercise program will be very important to the recovery process.    Opioid medications are utilized for a short course after surgery for pain control.     RIGHT SHOULDER  Arthroscopic distal clavicle resection CPT code 51961  Arthroscopic SLAP repair CPT code 78618  Shoulder impingement syndrome    Counseled again on operative vs nonoperative measures.  He desires to proceed with surgery which is very reasonable.  He is definitely exhausted conservative course with the distal clavicle which is his primary pain generator.  He still able to throw ball and other things of the SLAP tear may be less problematic and may not require addressing at the time of surgery but will closely be scrutinized.    We will closely scrutinize intraoperatively to assess the SLAP tear as it may not necessitate surgery.  The AC joint distal clavicle osteolysis is a major pain generator and will need addressed -- the SLAP may not.    Ultrasling III - a neutral rotation sling is recommended for this patient for perioperative care.  The patient was fitted for the sling and the sling was " provided today.  The sling will be a critical part of the perioperative care for these diagnoses.      Beachchair position  Distal clavicle osteolysis-distal clavicle resection, possible SLAP repair did  possible biceps tenodesis but this would be unlikely to be necessary.      The patient desires to proceed with RIGHT shoulder surgery.      Follow Up: He desires to proceed with surgery.      Isaac Borden MD, FAAOS  Orthopedic Surgeon  Fellowship Trained Shoulder and Elbow Surgeon  [facility]  Orthopedics and Sports Medicine  [address] [place] [address], [address]  [place] [address]    09/13/22  16:19 EDT

## 2022-11-21 ENCOUNTER — DOCUMENTATION (OUTPATIENT)
Dept: ORTHOPEDIC SURGERY | Facility: CLINIC | Age: 42
End: 2022-11-21

## 2022-11-21 ENCOUNTER — OUTSIDE FACILITY SERVICE (OUTPATIENT)
Dept: ORTHOPEDIC SURGERY | Facility: CLINIC | Age: 42
End: 2022-11-21

## 2022-11-21 DIAGNOSIS — M25.511 RIGHT SHOULDER PAIN, UNSPECIFIED CHRONICITY: ICD-10-CM

## 2022-11-21 DIAGNOSIS — S43.431A SUPERIOR GLENOID LABRUM LESION OF RIGHT SHOULDER, INITIAL ENCOUNTER: ICD-10-CM

## 2022-11-21 DIAGNOSIS — M19.011 OSTEOARTHRITIS OF RIGHT AC (ACROMIOCLAVICULAR) JOINT: Primary | ICD-10-CM

## 2022-11-21 PROCEDURE — 29826 SHO ARTHRS SRG DECOMPRESSION: CPT | Performed by: ORTHOPAEDIC SURGERY

## 2022-11-21 PROCEDURE — 29824 SHO ARTHRS SRG DSTL CLAVICLC: CPT | Performed by: ORTHOPAEDIC SURGERY

## 2022-11-21 PROCEDURE — 29824 SHO ARTHRS SRG DSTL CLAVICLC: CPT

## 2022-11-21 PROCEDURE — 29826 SHO ARTHRS SRG DECOMPRESSION: CPT

## 2022-11-21 RX ORDER — HYDROCODONE BITARTRATE AND ACETAMINOPHEN 10; 325 MG/1; MG/1
1 TABLET ORAL EVERY 4 HOURS PRN
Qty: 30 TABLET | Refills: 0 | Status: SHIPPED | OUTPATIENT
Start: 2022-11-21 | End: 2022-11-26

## 2022-11-21 RX ORDER — METHOCARBAMOL 500 MG/1
500 TABLET, FILM COATED ORAL 3 TIMES DAILY PRN
Qty: 40 TABLET | Refills: 1 | Status: SHIPPED | OUTPATIENT
Start: 2022-11-21 | End: 2022-12-05

## 2022-11-21 RX ORDER — ONDANSETRON 4 MG/1
4 TABLET, FILM COATED ORAL EVERY 6 HOURS PRN
Qty: 30 TABLET | Refills: 1 | Status: SHIPPED | OUTPATIENT
Start: 2022-11-21 | End: 2022-11-29

## 2022-11-21 NOTE — PROGRESS NOTES
Operative Report     Side: Right shoulder    LOCATION: [facility]  [address] [place] [address]    [facility] OPERATIVE REPORT       Surgeon: Isaac Borden MD     Assistant: RODNEY Menon     The skilled assistance of the above noted first assistant was necessary during this complex surgical procedure.  The surgical assistant assisted with every aspect of the operation including, but not limited to, proper and safe positioning of the patient, obtaining adequate surgical exposure, manipulation of surgical instruments, suture management, surgical knot tying when necessary, the continual process of hemostasis during the procedure itself in addition to surgical wound closure and removal of the patient from the operating table and returning the patient back to the [facility].  The assistance of the surgical assistant allowed me to perform the most sensitive and technical potions of this operation using 2 hands, thus enhancing efficiency and patient safety.  This would not be possible without the help of a skilled assistant familiar with the procedure and capable of safely performing the aforementioned tasks.        Date of surgery 11/21/2022  Right shoulder   Preoperative Diagnosis:  1.       Right distal clavicle arthritis, symptomatic  2.       Right shoulder impingement syndrome     Postoperative Diagnosis:  1.     SAME as preoperative diagnoses     Procedure:  1.     Arthroscopic distal clavicle resection CPT code 76059  2.    Arthroscopic subacromial decompression CPT code 29365        Admission status: elective surgery     COVID-19 Statement: The patient understands that the surgery is elective surgery.  I discussed with the patient the risks and benefits of proceeding with surgery vs delaying surgery during the COVID-19 pandemic. The risks we discussed included but were not limited to the risk of mitzi a severe  COVID-19 infection due to exposure within the hospital or during convalescence at home that may result in permanent injury or death.  The patient voiced understanding and gave their consent to proceed.  The patient desires to proceed.      Complications: None     EBL: Minimal     Specimen: None     Anesthesia: general anesthesia     Block: Single shot block per anesthesia     Antibiotics: weight based IV antibiotics infused prior to incision     Time out: Time out was called and the patient, side, site, and intended procedures were confirmed.     Counts: Needle and sponge counts were correct prior to closure.     DVT prophylaxis: The patient will be weight bearing as tolerated on bilateral lower extremities postoperatively with no additional chemical DVT prophylaxis indicated.     Marked: The patient was marked with an indelible marker in the preoperative holding area confirming the correct side and site.     History & Physical:   A history and physical examination was completed and updated in the preoperative holding area.     Consent: The patient signed the consent form for surgery with an understanding of the risks and benefits as outlined in the clinic and in the preoperative holding area.     Risks and Benefits: Specific risks and benefits discussed included - pain, bleeding, infection, injury to nerves or blood vessels, fracture, stiffness, failure to heal, revision surgery, complications of the block when administered, anesthetic complications, and medical complications associated with surgery.  COVID-19 awareness as noted and discussed.    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  The distal clavicle osteolysis continue to worsen over time he had complete loss of joint space of the distal clavicle/AC joint.  On an MRI arthrogram he had an incidentally noted concern for superior labral tearing.   This was closely scrutinized today at the time of surgery.  This appeared to be a sublabial foramen perhaps mild degenerative changes but no tearing into the base of the biceps.  The biceps was intact.  He had minimal articular sided fraying of the supraspinatus otherwise the rotator cuff was pristine appearing.  The rotator cuff was well-appearing from the bursal side.    His primary pain generator in clinic and notable at the time of surgery was his distal clavicle.  He had significant osteolysis on plain x-rays over the years and significant loss of joint space and progression of arthritis of the AC joint over time.  He is still been able to throw a ball and spend time with his sons without pain in the glenohumeral joint.    Implants: None    Procedure in Detail:  Patient was seen identified in preoperative area marked and consented.  Identified the distal clavicle he had prominence of the distal clavicle.  Negative DLS clinically over time and asymptomatic superior labral findings.  Patient underwent general anesthesia after a block of been completed placed in a modified beachchair position.  All bony prominences were well-padded the neck was secured in neutral alignment.  I closely scrutinize the AC joint was noted to be stable but very prominent bulbous end of the distal clavicle.  The AC joint was stable following resection as well.    Cleaned with alcohol sterilely prepped and draped and then utilized an additional prep stick.  The shoulder joint was noted to be stable and no contracture was noted.  I started with a diagnostic arthroscopy with the scope posteriorly.  The superior labral findings appear to be mildly degenerative and a sublabial foramen without extension into the base of the biceps.  The rest of the intra-articular findings were satisfactory the subscapularis was intact no obvious chondral lesions.  The undersurface of the supraspinatus had minimal articular sided fraying.  The rest of the  biceps is well-appearing as well.    I transition to the subacromial space I performed a subacromial decompression with bursectomy in advance of distal clavicle resection.  I used the shaver and cautery device for the subacromial decompression.  I used the 5.5 mm bur for formal subacromial decompression including acromioplasty.  I identified the bulbous distal clavicle end and transition the camera to the lateral portal.  The anterior portal was utilized to complete the distal clavicle resection using the shaver and cautery device along with the 5.5 mm bur.  I resected 5 mm from the acromial side and 5+ millimeters from the clavicular side.  The bulbous end of the distal clavicle was resected and the area of distal clavicle osteolysis was resected.  Stable findings of the AC joint were noted following resection.  I transitioned the camera to the anterior portal and confirmed no impingement was remaining and excellent resection was noted.    Scope fluid was removed.  Closed all 3 portals with nylon suture.  Steri-Strips sterile dressing and a neutral rotation sling were placed.      POSTOPERATIVE PLAN:  Follow-up in the office in 2 weeks with 1 view of the operative shoulder at that time  Sutures: Nylon sutures to be removed in the office in 2-week  DVT prophylaxis: No additional chemical DVT prophylaxis indicated, weight-bear as tolerated bilateral lower extremities  Weightbearing: Nonweightbearing on operative extremity  Sling for 2-3 weeks following the surgery  Physical therapy: Formal outpatient physical therapy will be offered at the 2-week appointment in the office.    Physical therapy can focus on scapular stabilization and early optimization of the shoulder.  Okay to gradually progress with weightbearing starting at the 3 to 4-week time point following surgery.  Allowing pain to be his guide to gradually progress with weightbearing.      Electronically signed by Isaac Borden MD, 11/21/22, 8:07 AM  EST.

## 2022-12-06 ENCOUNTER — OFFICE VISIT (OUTPATIENT)
Dept: ORTHOPEDIC SURGERY | Facility: CLINIC | Age: 42
End: 2022-12-06

## 2022-12-06 DIAGNOSIS — Z98.890 POST-OPERATIVE STATE: Primary | ICD-10-CM

## 2022-12-06 PROCEDURE — 99024 POSTOP FOLLOW-UP VISIT: CPT | Performed by: PHYSICIAN ASSISTANT

## 2022-12-06 NOTE — PROGRESS NOTES
[facility]        Subjective     CC: Post-op (2 weeks s/p (R) shoulder arthroscopy with VLAD OVIEDO 11/21/2022)      HPI    Jonathan Stephens is a 41 y.o. male.  Patient presents today status post right shoulder scope with distal clavicle excision and subacromial decompression with Dr. Borden.  He reports no pain in the shoulder.  He has been avoiding overhead activity since as instructed.  Happy with his progress.    Overall, patient's symptoms are improving    ROS:    Constiutional:Pt denies fever, chills, nausea, or vomiting.  MSK:as above        Objective      Past Medical History  Past Medical History:   Diagnosis Date   • HTN, goal below 130/80    • Osteoarthritis    • Periarthritis of shoulder          Physical Exam  There were no vitals taken for this visit.    There is no height or weight on file to calculate BMI.    Patient is well nourished and well developed.        Ortho Exam  Right shoulder exam: Incisions are well.  Mild ecchymosis and anterior aspect shoulder.  Normal range of motion.  Neurovascular intact distally.  Pulses 2+.    Imaging/Labs/EMG Reviewed:  Imaging Results (Last 24 Hours)     Procedure Component Value Units Date/Time    XR Shoulder 1 View Right [[id number]] Resulted: 12/06/22 1308     Updated: 12/06/22 1311            Assessment    Assessment:  1. Post-operative state        Plan:  1. Recommend over the counter anti-inflammatories for pain and/or swelling  2. Doing well status post right shoulder scope with distal clavicle excision and subacromial decompression with Dr. Borden on 11/21/2022.  We reviewed intraoperative photographs with the patient.  Sutures removed Steri-Strips applied over the incision.  Patient may slowly increase his activity.  He should avoid any heavy lifting for the next couple of months.  He will return to see us as needed.  Should he have any difficulties or problems, he will contact Dr. Borden personally.    History, diagnosis  and treatment plan discussed with Dr. Borden.        Karo Gaona PA-C  12/06/22  13:37 EST